# Patient Record
Sex: MALE | Race: WHITE | Employment: FULL TIME | URBAN - METROPOLITAN AREA
[De-identification: names, ages, dates, MRNs, and addresses within clinical notes are randomized per-mention and may not be internally consistent; named-entity substitution may affect disease eponyms.]

---

## 2017-06-29 ENCOUNTER — OFFICE VISIT (OUTPATIENT)
Dept: ORTHOPEDIC SURGERY | Age: 57
End: 2017-06-29

## 2017-06-29 VITALS
DIASTOLIC BLOOD PRESSURE: 90 MMHG | WEIGHT: 200 LBS | HEART RATE: 62 BPM | SYSTOLIC BLOOD PRESSURE: 133 MMHG | BODY MASS INDEX: 27.09 KG/M2 | HEIGHT: 72 IN

## 2017-06-29 DIAGNOSIS — M16.12 PRIMARY OSTEOARTHRITIS OF LEFT HIP: Primary | ICD-10-CM

## 2017-06-29 DIAGNOSIS — M25.552 LEFT HIP PAIN: ICD-10-CM

## 2017-06-29 PROCEDURE — 73502 X-RAY EXAM HIP UNI 2-3 VIEWS: CPT | Performed by: ORTHOPAEDIC SURGERY

## 2017-06-29 PROCEDURE — 99214 OFFICE O/P EST MOD 30 MIN: CPT | Performed by: ORTHOPAEDIC SURGERY

## 2017-08-23 DIAGNOSIS — M25.552 PAIN OF LEFT HIP JOINT: Primary | ICD-10-CM

## 2017-10-05 ENCOUNTER — OFFICE VISIT (OUTPATIENT)
Dept: ORTHOPEDIC SURGERY | Age: 57
End: 2017-10-05

## 2017-10-05 VITALS — BODY MASS INDEX: 27.08 KG/M2 | HEIGHT: 72 IN | WEIGHT: 199.96 LBS

## 2017-10-05 DIAGNOSIS — M16.12 PRIMARY OSTEOARTHRITIS OF LEFT HIP: Primary | ICD-10-CM

## 2017-10-05 PROCEDURE — 99214 OFFICE O/P EST MOD 30 MIN: CPT | Performed by: ORTHOPAEDIC SURGERY

## 2017-10-05 NOTE — LETTER
Attention    These are medical screening labs, not pre-admission surgery labs. Via Win Aguilar M.D.  [phone]  [address] [place] [address], [address] [place] [address]    Date:  10/5/2017    Name: Allison Guevara    : 1960    Please take this form with you.       THE FOLLOWING LABS NEED TO BE COMPLETED:    _x__UA  _x__URINE C/S (THIS NEEDS TO BE DONE EVEN IF THE UA IS NORMAL)  _x__CBC W/ DIFF  _x__PT/INR  _x__PTT  _x__TRANSFERRIN  _x__ALBUMIN  _x__CHEM 7  _x__HEMAGLOBIN A1-C  ____OTHER: _____________________________________________                         Diagnosis:  LT HIP OSTEOARTHRITIS            RT KNEE OA M17.11  LT KNEE OA M17.12         RT HIP OA  M16.11     LT HIP OA M16.12             Z01.812  (Pre-op examination code)      10/5/2017 5:13 PM  Sarina Ray PA-C

## 2017-10-05 NOTE — PROGRESS NOTES
appropriate. Lymphatic: The lymphatic examination bilaterally reveals all areas to be without enlargement or induration. Vascular: Examination reveals no swelling or calf tenderness. Peripheral pulses are palpable and 2+. Neurological: The patient has good coordination. There is no weakness or sensory deficit.     Left hip Examination:     Inspection:  No erythema or signs of infection. There are no cutaneous lesions.     Palpation:  No focal tenderness to palpation. No tenderness over the greater trochanteric region.     Range of Motion:  He has limited range of motion with flexion and external rotation. His internal rotation is significantly limited. This causes reproducible groin pain.      Strength:  5/5 abduction and adduction and flexion     Special Tests:  Positive log roll maneuver reproducing pain. Negative Mirlande's test.  Negative Homans test.     Skin: There are no rashes, ulcerations or lesions.     Gait: Normal     Reflex 2+ patellar     Additional Comments:        Additional Examinations:         Examination of the right hip reveals intact skin. The patient demonstrates full painless range of motion with regards to flexion, abduction, internal and external rotation. There is no tenderness about the greater trochanter. There is a negative straight leg raise against resistance. Strength is 5/5 throughout all planes.        Radiology:     X-rays obtained in the office today:  Views 2  Location left hip  Impression no fractures or malalignment identified. There is evidence of end-stage osteoarthritis of the left hip with femoral acetabular joint space narrowing with sclerosis, subchondral cysts and osteophyte formation.         Assessment :  Left Hip osteoarthritis     Impression:  Encounter Diagnoses   Name Primary?     Left hip pain      Primary osteoarthritis of left hip Yes         Office Procedures:        Orders Placed This Encounter   Procedures    Hip Left 2V       [address]       Order

## 2017-10-05 NOTE — MR AVS SNAPSHOT
Learn more at: [email or web address]          Instructions    Management discussed and patient voiced understanding. They were instructed to follow up with their PCP regarding any abnormal vitals reading. Medications and Orders      Allergies           No Known Allergies      We Ordered/Performed the following           OSR PT [facility] Physical Therapy     Scheduling Instructions:    Margaret Candelario and [address] [facility]  [address] [place] [address] [facility] [address]  [phone]    EVAL AND TREAT:  LT HIP OA  PreHab Evaluation for Total Joint Surgery    EVALUATION MUST BE COMPLETED ONE OF THE FOLLOWING OFFICES:    Cheryl [address]       ([address] [facility])  ([facility] [address] [address] [place] [address] [address]  [facility], [address]: [phone]   [facility]:  [phone]       [address] [facility])     [address] [facility] [address]      [facility]:  [phone]    Comments: The patient can be scheduled with any member of the group, including the provider with the first available appointments. Additional Information        Basic Information     Date Of Birth Sex Race Ethnicity Preferred Language    1960 Male White Non-/Non  English      Problem List as of 10/5/2017  Date Reviewed: 10/5/2017                Primary osteoarthritis of left hip      Preventive Care        Date Due    Hepatitis C screening is recommended for all adults regardless of risk factors born between [birth year] and 1965 at least once (lifetime) who have never been tested. 1960    HIV screening is recommended for all people regardless of risk factors  aged 15-65 years at least once (lifetime) who have never been HIV tested.  7/15/1976    Tetanus Combination Vaccine (1 - Tdap) 7/15/1980    Cholesterol Screening 7/15/2001 Diabetes Screening 7/15/2001    Colonoscopy 7/15/2011    Yearly Flu Vaccine (1) 9/1/2017            MyChart Signup           [employer] allows you to send messages to your doctor, view your test results, renew your prescriptions, schedule appointments, view visit notes, and more. How Do I Sign Up? 1. In your Internet browser, go to [email or web address]  2. Click on the Sign Up Now link in the Sign In box. You will see the New Member Sign Up page. 3. Enter your [employer] Access Code exactly as it appears below. You will not need to use this code after youve completed the sign-up process. If you do not sign up before the expiration date, you must request a new code. [employer] Access Code: [email or web address]  Expires: 12/4/2017  5:13 PM    4. Enter your Social Security Number ([id number]) and Date of Birth (mm/dd/yyyy) as indicated and click Submit. You will be taken to the next sign-up page. 5. Create a [employer] ID. This will be your [employer] login ID and cannot be changed, so think of one that is secure and easy to remember. 6. Create a [employer] password. You can change your password at any time. 7. Enter your Password Reset Question and Answer. This can be used at a later time if you forget your password. 8. Enter your e-mail address. You will receive e-mail notification when new information is available in [address] 9. Click Sign Up. You can now view your medical record. Additional Information  If you have questions, please contact the physician practice where you receive care. Remember, [employer] is NOT to be used for urgent needs. For medical emergencies, dial 911. For questions regarding your [employer] account call [phone]. If you have a clinical question, please call your doctor's office.

## 2017-10-24 ENCOUNTER — HOSPITAL ENCOUNTER (OUTPATIENT)
Dept: PHYSICAL THERAPY | Age: 57
Discharge: HOME | End: 2017-10-31
Admitting: ORTHOPAEDIC SURGERY

## 2017-10-24 NOTE — FLOWSHEET NOTE
[place] [address] [facility], [address]  Phone: [phone]  Fax [phone]    Physical Therapy Daily Treatment Note  Date:  10/24/2017    Patient Name:  Josh Hartley    :  1960  MRN: [id number]  Restrictions/Precautions:    Medical/Treatment Diagnosis Information:  · Diagnosis: Left hip OA (M16.12)  · Treatment Diagnosis: Left hip pain (J98.631)  Insurance/Certification information:  PT Insurance Information: [facility]  Physician Information:  Referring Practitioner: Davis Roy signed (Y/N): Routed this date    Date of Patient follow up with Physician:   G-Code (if applicable):      Date G-Code Applied:    PT G-Codes  Functional Assessment Tool Used: LEFS  Score: 13%  Functional Limitation: Mobility: Walking and moving around  Mobility: Walking and Moving Around Current Status (): At least 1 percent but less than 20 percent impaired, limited or restricted  Mobility: Walking and Moving Around Goal Status ():  At least 1 percent but less than 20 percent impaired, limited or restricted    Progress Note: [x]  Yes  []  No  Next due by: Visit #10       Latex Allergy:  [x]NO      []YES  Preferred Language for Healthcare:   [x]English       []other:    Visit # Insurance Allowable   1 30     Pain level:  2-3/10     SUBJECTIVE:  See eval    OBJECTIVE: See eval  Observation:   Test measurements:      RESTRICTIONS/PRECAUTIONS: None    Exercises/Interventions: DEMO 1 repetition to patient for each exercise    Therapeutic Ex Sets/reps Notes        Seated HS stretch 1 x30 sec HEP   Seated calf stretch 1x30 sec HEP   Seated QS BLEs 1 x 10\" HEP   Supine heelslide 1x HEP   Isometric abdominal 1x 10\" HEP   LAQ  1x HEP   minisquats 1x HEP                            Pt ed  HEP, POC, post op restrictions/precautions, healing process, expectations for surgery             Manual Intervention                                   NMR re-education                                                      Therapeutic Exercise and NMR EXR  [x] (99639) Provided verbal/tactile cueing for activities related to strengthening, flexibility, endurance, ROM for improvements in LE, proximal hip, and core control with self care, mobility, lifting, ambulation.  [] (94318) Provided verbal/tactile cueing for activities related to improving balance, coordination, kinesthetic sense, posture, motor skill, proprioception  to assist with LE, proximal hip, and core control in self care, mobility, lifting, ambulation and eccentric single leg control.      NMR and Therapeutic Activities:    [] (21214 or 69822) Provided verbal/tactile cueing for activities related to improving balance, coordination, kinesthetic sense, posture, motor skill, proprioception and motor activation to allow for proper function of core, proximal hip and LE with self care and ADLs  [] (30788) Gait Re-education- Provided training and instruction to the patient for proper LE, core and proximal hip recruitment and positioning and eccentric body weight control with ambulation re-education including up and down stairs     Home Exercise Program:    [x] (64173) Reviewed/Progressed HEP activities related to strengthening, flexibility, endurance, ROM of core, proximal hip and LE for functional self-care, mobility, lifting and ambulation/stair navigation   [] (56894)Reviewed/Progressed HEP activities related to improving balance, coordination, kinesthetic sense, posture, motor skill, proprioception of core, proximal hip and LE for self care, mobility, lifting, and ambulation/stair navigation      Manual Treatments:  PROM / STM / Oscillations-Mobs:  G-I, II, III, IV (PA's, Inf., Post.)  [] (20680) Provided manual therapy to mobilize LE, proximal hip and/or LS spine soft tissue/joints for the purpose of modulating pain, promoting relaxation,  increasing ROM, reducing/eliminating soft tissue

## 2017-10-24 NOTE — PLAN OF CARE
impaired, limited or restricted  Mobility: Walking and Moving Around Goal Status (): At least 1 percent but less than 20 percent impaired, limited or restricted    Pain Scale: 2-3/10  Easing factors: rest  Provocative factors: prolonged walking, prolonged standing, golfing    Type: []Constant   [x]Intermittent  []Radiating []Localized []other:     Numbness/Tingling: Denies N/T    Occupation/School:     Living Status/Prior Level of Function: Independent with ADLs and IADLs, Lives with wife; enjoys golfing, running, tennis, working out    Objective:          Reflexes/Myotomes:   [x]Dermatomes/Myotomes intact    [x]Reflexes equal and normal bilaterally   []Other:    Joint mobility:    []Normal    [x]Hypo in left hip   []Hyper    Palpation: no significant TTP    Functional Mobility/Transfers: Independent    Posture: Fair    Bandages/Dressings/Incisions: N/A    Gait: (include devices/WB status) Mild antalgic gait pattern. Becomes increasingly antalgic with more activity. Orthopedic Special Tests: None this date                       [x] Patient history, allergies, meds reviewed. Medical chart reviewed. See intake form. Review Of Systems (ROS):  [x]Performed Review of systems (Integumentary, CardioPulmonary, Neurological) by intake and observation. Intake form has been scanned into medical record. Patient has been instructed to contact their primary care physician regarding ROS issues if not already being addressed at this time.       Co-morbidities/Complexities (which will affect course of rehabilitation):   [x]None           Arthritic conditions   []Rheumatoid arthritis (M05.9)  []Osteoarthritis (M19.91)   Cardiovascular conditions   []Hypertension (I10)  []Hyperlipidemia (E78.5)  []Angina pectoris (I20)  []Atherosclerosis (I70)   Musculoskeletal conditions   []Disc pathology   []Congenital spine pathologies   []Prior surgical intervention  []Osteoporosis (M81.8)  []Osteopenia (M85.8) changes of positions or transfers between positions   []Reduced ability to maintain good posture and demonstrate good body mechanics with sitting, bending, and lifting   []Reduced ability to sleep   [] Reduced ability or tolerance with driving and/or computer work   []Reduced ability to perform lifting, carrying tasks   []Reduced ability to squat   []Reduced ability to forward bend   [x]Reduced ability to ambulate prolonged functional periods/distances/surfaces   []Reduced ability to ascend/descend stairs   [x]Reduced ability to run, hop, cut or jump   []other:    Participation Restrictions   []Reduced participation in self care activities   []Reduced participation in home management activities   []Reduced participation in work activities   [x]Reduced participation in social activities. [x]Reduced participation in sport/recreation activities. Classification :    []Signs/symptoms consistent with post-surgical status including decreased ROM, strength and function.    []Signs/symptoms consistent with joint sprain/strain   []Signs/symptoms consistent with patella-femoral syndrome   [x]Signs/symptoms consistent with knee OA/hip OA   []Signs/symptoms consistent with internal derangement of knee/Hip   []Signs/symptoms consistent with functional hip weakness/NMR control      []Signs/symptoms consistent with tendinitis/tendinosis    []signs/symptoms consistent with pathology which may benefit from Dry needling      []other:      Prognosis/Rehab Potential:      []Excellent   []Good    [x]Fair   []Poor    Tolerance of evaluation/treatment:    []Excellent   []Good    [x]Fair   []Poor  Physical Therapy Evaluation Complexity Justification  [x] A history of present problem with:  [x] no personal factors and/or comorbidities that impact the plan of care;  []1-2 personal factors and/or comorbidities that impact the plan of care  []3 personal factors and/or comorbidities that impact the plan of care  [x] An examination of body systems using standardized tests and measures addressing any of the following: body structures and functions (impairments), activity limitations, and/or participation restrictions;:  [x] a total of 1-2 or more elements   [] a total of 3 or more elements   [] a total of 4 or more elements   [x] A clinical presentation with:  [x] stable and/or uncomplicated characteristics   [] evolving clinical presentation with changing characteristics  [] unstable and unpredictable characteristics;   [x] Clinical decision making of [x] low, [] moderate, [] high complexity using standardized patient assessment instrument and/or measurable assessment of functional outcome. [x] EVAL (LOW) 56252 (typically 20 minutes face-to-face)  [] EVAL (MOD) 49953 (typically 30 minutes face-to-face)  [] EVAL (HIGH) 66994 (typically 45 minutes face-to-face)  [] RE-EVAL       PLAN:   Frequency/Duration:  1 visit:  Interventions:  [x]  Therapeutic exercise including: strength training, ROM, for Lower extremity and core   [x]  NMR activation and proprioception for LE, Glutes and Core   [x]  Manual therapy as indicated for LE, Hip and spine to include: Dry Needling/IASTM, STM, PROM, Gr I-IV mobilizations, manipulation. [x] Modalities as needed that may include: thermal agents, E-stim, Biofeedback, US, iontophoresis as indicated  [x] Patient education on joint protection, postural re-education, activity modification, progression of HEP. [x] Patient appears to be functionally prepared for surgery and will continue with a home exercise program until surgery date.   [] Patient will be ready for surgery with a short period of structured physical therapy  [] Patient does not appear to be functionally ready for surgery and requires a prolonged  structure program    At this point, it appears patient's discharge status from hospital should be:   [x] Home with home exercise program  [] Home with home health care  [] [address]

## 2017-11-01 ENCOUNTER — HOSPITAL ENCOUNTER (OUTPATIENT)
Dept: PHYSICAL THERAPY | Age: 57
Discharge: HOME | End: 2017-11-30
Attending: ORTHOPAEDIC SURGERY | Admitting: ORTHOPAEDIC SURGERY

## 2017-11-30 NOTE — LETTER
[facility]   Pre-Cert / Billing Form      Patient Name:  Sage Peters  Patient :  1960     Patient SS#:      Patient Phone:  [phone] (home) [phone] (work)   Patient Address:  [place] [address]    PCP:  Baljinder Sanchez MD    Insurance:  Payor: Ayan Doe / Plan: [facility] / Product Type: *No Product type* /     Insurance ID Number:   [id number]     Date:  17  Location:  [facility]  Type:    INPATIENT  Surgeon:  Lauren Duggan M.D.                                     BILLING INFORMATION:                                                                                                Procedure/Diagnosis           CPT Code/ICD-9     Modifier            Pre-Certification:    Precert information:

## 2017-11-30 NOTE — LETTER
11/30/2017            Please complete the enclosed survey and return prior to your surgery. If you have any questions please contact the office at [phone]. Thank you in advance for your participation. Hip dysfunction and Osteoarthritis Outcome Score for Joint Replacement (JR PHYLLIS.), English version 1.0     JR PHYLLIS. HIP SURVEY     Eileen Guido         1960               11/30/2017                         INSTRUCTIONS: This survey asks for your view about your hip. This information will help us keep track of how you feel about your hip and how well you are able to do your usual activities. Answer every question by ticking the appropriate box, only one box for each question. If you are unsure about how to answer a question, please give the best answer you can. Pain   What amount of hip pain have you experienced the last week during the following activities? 1. Going up or down stairs               None                Mild                Moderate                Severe                 Extreme                                                                                                              2. Walking on an uneven surface            None                Mild                Moderate                Severe                 Extreme                                                                                                               Function, daily living   The following questions concern your physical function. By this we mean your ability to move around and to look after yourself. For each of the following activities please indicate the degree of difficulty you have experienced in the last week due to your hip.       3. Rising from sitting            None                Mild                Moderate                Severe                Extreme

## 2017-12-05 ENCOUNTER — HOSPITAL ENCOUNTER (OUTPATIENT)
Dept: CT IMAGING | Age: 57
Discharge: HOME | End: 2017-12-05
Attending: ORTHOPAEDIC SURGERY | Admitting: ORTHOPAEDIC SURGERY

## 2017-12-05 DIAGNOSIS — M25.552 PAIN, JOINT, HIP, LEFT: ICD-10-CM

## 2017-12-05 LAB
ALBUMIN SERPL-MCNC: 4.3 G/DL (ref 3.4–5)
ANION GAP SERPL CALCULATED.3IONS-SCNC: 11 MMOL/L (ref 3–16)
APTT: 31.1 SEC (ref 24.1–34.9)
BASOPHILS ABSOLUTE: 0 K/UL (ref 0–0.2)
BASOPHILS RELATIVE PERCENT: 0.4 %
BILIRUBIN URINE: NEGATIVE
BLOOD, URINE: ABNORMAL
BUN BLDV-MCNC: 17 MG/DL (ref 7–20)
CALCIUM SERPL-MCNC: 9.2 MG/DL (ref 8.3–10.6)
CHLORIDE BLD-SCNC: 101 MMOL/L (ref 99–110)
CLARITY: CLEAR
CO2: 27 MMOL/L (ref 21–32)
COLOR: YELLOW
CREAT SERPL-MCNC: 1 MG/DL (ref 0.9–1.3)
EOSINOPHILS ABSOLUTE: 0.2 K/UL (ref 0–0.6)
EOSINOPHILS RELATIVE PERCENT: 3.6 %
ESTIMATED AVERAGE GLUCOSE: 99.7 MG/DL
GFR AFRICAN AMERICAN: >60
GFR NON-AFRICAN AMERICAN: >60
GLUCOSE BLD-MCNC: 90 MG/DL (ref 70–99)
GLUCOSE URINE: NEGATIVE MG/DL
HBA1C MFR BLD: 5.1 %
HCT VFR BLD CALC: 44.3 % (ref 40.5–52.5)
HEMOGLOBIN: 15.3 G/DL (ref 13.5–17.5)
INR BLD: 1.03 (ref 0.85–1.15)
KETONES, URINE: NEGATIVE MG/DL
LEUKOCYTE ESTERASE, URINE: NEGATIVE
LYMPHOCYTES ABSOLUTE: 1.9 K/UL (ref 1–5.1)
LYMPHOCYTES RELATIVE PERCENT: 39.1 %
MCH RBC QN AUTO: 32.2 PG (ref 26–34)
MCHC RBC AUTO-ENTMCNC: 34.6 G/DL (ref 31–36)
MCV RBC AUTO: 92.9 FL (ref 80–100)
MICROSCOPIC EXAMINATION: YES
MONOCYTES ABSOLUTE: 0.4 K/UL (ref 0–1.3)
MONOCYTES RELATIVE PERCENT: 7.8 %
NEUTROPHILS ABSOLUTE: 2.4 K/UL (ref 1.7–7.7)
NEUTROPHILS RELATIVE PERCENT: 49.1 %
NITRITE, URINE: NEGATIVE
PDW BLD-RTO: 13 % (ref 12.4–15.4)
PH UA: 6
PLATELET # BLD: 220 K/UL (ref 135–450)
PMV BLD AUTO: 8.1 FL (ref 5–10.5)
POTASSIUM SERPL-SCNC: 4.1 MMOL/L (ref 3.5–5.1)
PROTEIN UA: NEGATIVE MG/DL
PROTHROMBIN TIME: 11.6 SEC (ref 9.6–13)
RBC # BLD: 4.76 M/UL (ref 4.2–5.9)
RBC UA: NORMAL /HPF (ref 0–2)
SODIUM BLD-SCNC: 139 MMOL/L (ref 136–145)
SPECIFIC GRAVITY UA: 1.02
TRANSFERRIN: 244 MG/DL (ref 200–360)
URINE TYPE: ABNORMAL
UROBILINOGEN, URINE: 0.2 E.U./DL
WBC # BLD: 5 K/UL (ref 4–11)
WBC UA: NORMAL /HPF (ref 0–5)

## 2017-12-06 LAB — URINE CULTURE, ROUTINE: NORMAL

## 2017-12-08 ENCOUNTER — TELEPHONE (OUTPATIENT)
Dept: ORTHOPEDIC SURGERY | Age: 57
End: 2017-12-08

## 2017-12-08 NOTE — ADDENDUM NOTE
Encounter addended by: Christopher Ragland MA on: 12/8/2017 10:51 AM<BR>    Actions taken: Letter status changed

## 2017-12-20 PROBLEM — M16.12 OSTEOARTHRITIS OF LEFT HIP: Status: ACTIVE | Noted: 2017-12-20

## 2017-12-27 ENCOUNTER — TELEPHONE (OUTPATIENT)
Dept: ORTHOPEDIC SURGERY | Age: 57
End: 2017-12-27

## 2017-12-27 NOTE — TELEPHONE ENCOUNTER
Spoke with pt. Incision status: No drainage or redness, changed dressing    Edema/Swelling/Teds: Left foot a little swollen. Wearing TEDS daily    Pain level and status: Pain is really not bad    Use of pain medications: Has only taken 3 percocet total, using mobic. Blood thinner: on aspirin BID    Bowels: Passing gas, a little constipated. Stopped percocet and back to normal    Home Care Agency active: N/A    Outpatient therapy: Not yet    Do you have all of your medications: Yes    Changes in medications: No    Ortho Vitals: Pt states he hasn't used it at all. He is a , so he is always staring at numbers and wanted a break.       Follow up appointments:    Future Appointments  Date Time Provider Gomez Bashir   1/2/2018 4:00 PM Dang De Jesus, [employer] [facility]   5/10/8026 3:04 PM Evan Bradshaw, PT [facility] None

## 2018-01-02 ENCOUNTER — OFFICE VISIT (OUTPATIENT)
Dept: ORTHOPEDIC SURGERY | Age: 58
End: 2018-01-02

## 2018-01-02 VITALS
SYSTOLIC BLOOD PRESSURE: 127 MMHG | BODY MASS INDEX: 26.76 KG/M2 | WEIGHT: 201.94 LBS | HEIGHT: 73 IN | DIASTOLIC BLOOD PRESSURE: 84 MMHG | HEART RATE: 67 BPM

## 2018-01-02 DIAGNOSIS — M16.12 PRIMARY OSTEOARTHRITIS OF LEFT HIP: ICD-10-CM

## 2018-01-02 DIAGNOSIS — M25.552 LEFT HIP PAIN: ICD-10-CM

## 2018-01-02 DIAGNOSIS — Z96.642 STATUS POST TOTAL REPLACEMENT OF LEFT HIP: Primary | ICD-10-CM

## 2018-01-02 PROCEDURE — 99024 POSTOP FOLLOW-UP VISIT: CPT | Performed by: PHYSICIAN ASSISTANT

## 2018-01-08 ENCOUNTER — TELEPHONE (OUTPATIENT)
Dept: ORTHOPEDIC SURGERY | Age: 58
End: 2018-01-08

## 2018-01-12 ENCOUNTER — TELEPHONE (OUTPATIENT)
Dept: ORTHOPEDIC SURGERY | Age: 58
End: 2018-01-12

## 2018-01-12 NOTE — TELEPHONE ENCOUNTER
Pt called pt about concerning red spot on his incision. Pt states he noticed redness today at top of incision. Part of dressing is peeling in that area, pt states he doesn't believe the dressing pulled off of that area forceably. Pt states area is slightly hard in that area, no where else. Pt states he had fever 2 nights ago. Skin not hot or warm in area or having any drainage. Instructed pt to keep eye on area and to call nurse back immediately with any increased redness, firmness, warmth, or fever. Wily WRIGHT to pt concern: he wanted pt to log into ortho vitals and take photo on incision so we can visualize concern.     [address] Nurse Navigator  Phone number: [phone]

## 2018-01-12 NOTE — TELEPHONE ENCOUNTER
Left voicemail for pt instructing pt to take photo of incision and submit into orthovitals agatha per Laureen WRIGHT instructions.     Antonia Elizabeth  Orthopedic Nurse Navigator  Phone number: [phone]

## 2018-01-17 ENCOUNTER — HOSPITAL ENCOUNTER (OUTPATIENT)
Dept: PHYSICAL THERAPY | Age: 58
Discharge: HOME | End: 2018-01-31
Admitting: ORTHOPAEDIC SURGERY

## 2018-01-17 ENCOUNTER — OFFICE VISIT (OUTPATIENT)
Dept: ORTHOPEDIC SURGERY | Age: 58
End: 2018-01-17

## 2018-01-17 DIAGNOSIS — Z96.642 STATUS POST TOTAL REPLACEMENT OF LEFT HIP: Primary | ICD-10-CM

## 2018-01-17 PROCEDURE — 99024 POSTOP FOLLOW-UP VISIT: CPT | Performed by: PHYSICIAN ASSISTANT

## 2018-01-17 NOTE — PROGRESS NOTES
History of present illness: The patient returns today after left anterior total hip replacement. Pain control as been satisfactory with oral medications. There have been no fevers or chills. Pain Assessment  Location of Pain: Pelvis  Location Modifiers: Left  Severity of Pain: 0  Limiting Behavior: No  Result of Injury: No  Work-Related Injury: No  Are there other pain locations you wish to document?: No]    Physical examination: The incision is clean, dry, and intact but There is some erythema around both incisions consistent with contact dermatitis. He does experience some itching in these areas as well. There is expected swelling with no evidence of DVT and a negative Homans sign. Neurovascular exam is intact. Leg length is appropriate. He relates well without the use of assistance. Assessment/plan: We would like to begin physical therapy to restore range of motion and strength. He can apply some topical hydrocortisone to the area around the incisions try to decrease some of the erythema. I would like to see him back in 2 weeks to recheck the wound to make sure this is resolving. We did not refilled their pain medication today.

## 2018-01-17 NOTE — PLAN OF CARE
[facility], [address]  Phone: [phone]  Fax [phone]     Physical Therapy Certification    Dear Referring Practitioner: Dr. Diogenes Mg,    We had the pleasure of evaluating the following patient for physical therapy services at [address] [place] [address]. A summary of our findings can be found in the initial assessment below. This includes our plan of care. If you have any questions or concerns regarding these findings, please do not hesitate to contact me at the office phone number checked above. Thank you for the referral.       Physician Signature:_______________________________Date:__________________  By signing above (or electronic signature), therapists plan is approved by physician    Patient: Audra Peck   : 1960   MRN: [id number]  Referring Physician: Referring Practitioner: Dr. Diogenes Mg      Evaluation Date: 2018      Medical Diagnosis Information:  Diagnosis: Left hip OA  M16.12,  Left hip pain  M25.552   Treatment Diagnosis: Left hip pain  M25.552                                         Insurance information: PT Insurance Information:  [facility]  - [id number]-PT/OT MED NEC     Precautions/ Contra-indications: anterior hip replacement  Latex Allergy:  [x]NO      []YES  Preferred Language for Healthcare:   [x]English       []other:    SUBJECTIVE: Patient stated complaint:pt has had pain in the lateral hip for two years. He had finally had enough. Pt had surgery. Went home after one day. Pt used walker for 4 weeks. Pt is having some irritation by incision. Relevant Medical History:n/a  Functional Disability Index:PT G-Codes  Functional Assessment Tool Used: LEFS  Score: 23%  Functional Limitation: Mobility: Walking and moving around  Mobility: Walking and Moving Around Current Status ():  At least 20 percent but less than 40 percent impaired, limited or restricted  Mobility: Walking and Moving Around Goal Status (): At least 1 percent but less than 20 percent impaired, limited or restricted    Pain Scale: 0/10  Easing factors: rest  Provocative factors: stairs, walking,      Type: []Constant   []Intermittent  []Radiating []Localized []other:     Numbness/Tingling: n/a    Occupation/School:     Living Status/Prior Level of Function: Independent with ADLs and IADLs, Golf, tennis, cardio, walking    OBJECTIVE:     ROM LEFT RIGHT   HIP Flex 90    HIP Abd 30    Knee ext 0    Knee Flex 130    Strength  LEFT RIGHT   HIP Flexors 3+  (did not apply resistance) 5   HIP Abductors     Knee EXT (quad) 5 5   Knee Flex (HS) 5 5   Ankle DF 5 5     Reflexes/Sensation:    [x]Dermatomes/Myotomes intact    [x]Reflexes equal and normal bilaterally   []Other:    Joint mobility:    [x]Normal    []Hypo   []Hyper    Palpation: tender along incision site    Functional Mobility/Transfers: Indep    Posture: iliac crests are aligned    Bandages/Dressings/Incisions: Pt has redness around incision site. Pt has some drainage. PA saw incision site this AM, and recommended using hydrocortisone cream    Gait: (include devices/WB status) symmetrical on level surfaces    Orthopedic Special Tests:  Negative Gamaliel's. Tight HS. Quads and hip flexor not assessed today. [x] Patient history, allergies, meds reviewed. Medical chart reviewed. See intake form. Review Of Systems (ROS):  [x]Performed Review of systems (Integumentary, CardioPulmonary, Neurological) by intake and observation. Intake form has been scanned into medical record. Patient has been instructed to contact their primary care physician regarding ROS issues if not already being addressed at this time.       Co-morbidities/Complexities (which will affect course of rehabilitation):   [x]None           Arthritic conditions   []Rheumatoid arthritis (M05.9)  []Osteoarthritis []Excellent   [x]Good    []Fair   []Poor  Physical Therapy Evaluation Complexity Justification  [x] A history of present problem with:  [x] no personal factors and/or comorbidities that impact the plan of care;  []1-2 personal factors and/or comorbidities that impact the plan of care  []3 personal factors and/or comorbidities that impact the plan of care  [x] An examination of body systems using standardized tests and measures addressing any of the following: body structures and functions (impairments), activity limitations, and/or participation restrictions;:  [] a total of 1-2 or more elements   [x] a total of 3 or more elements   [] a total of 4 or more elements   [x] A clinical presentation with:  [x] stable and/or uncomplicated characteristics   [] evolving clinical presentation with changing characteristics  [] unstable and unpredictable characteristics;   [x] Clinical decision making of [x] low, [] moderate, [] high complexity using standardized patient assessment instrument and/or measurable assessment of functional outcome. [x] EVAL (LOW) 43599 (typically 20 minutes face-to-face)  [] EVAL (MOD) 78725 (typically 30 minutes face-to-face)  [] EVAL (HIGH) 50230 (typically 45 minutes face-to-face)  [] RE-EVAL       PLAN:   Frequency/Duration:  1 days per week for 6 Weeks:  Interventions:  [x]  Therapeutic exercise including: strength training, ROM, for Lower extremity and core   [x]  NMR activation and proprioception for LE, Glutes and Core   [x]  Manual therapy as indicated for LE, Hip and spine to include: Dry Needling/IASTM, STM, PROM, Gr I-IV mobilizations, manipulation. [x] Modalities as needed that may include: thermal agents, E-stim, Biofeedback, US, iontophoresis as indicated  [x] Patient education on joint protection, postural re-education, activity modification, progression of HEP. HEP instruction: (see scanned forms)    GOALS:  Patient stated goal: Complete recovery.   Golf in april    Therapist goals for Patient:   Short Term Goals: To be achieved in: 2 weeks  1. Independent in HEP and progression per patient tolerance, in order to prevent re-injury. 2. Patient will have a decrease in pain to facilitate improvement in movement, function, and ADLs as indicated by Functional Deficits. Long Term Goals: To be achieved in: 6 weeks  1. Disability index score of 0% or less for the LEFS to assist with reaching prior level of function. 2. Patient will demonstrate increased AROM to 110 hip flex, 45 abd to allow for proper joint functioning as indicated by patients Functional Deficits. 3. Patient will demonstrate an increase in Strength to good proximal hip strength and control, within 5lb HHD in LE to allow for proper functional mobility as indicated by patients Functional Deficits. 4. Patient will return to walking on unlevel surfaces (grass, gravel, etc)  without increased symptoms or restriction. 5. Possible GAP candidate for return to golf.      Electronically signed by:  Asia Finnegan PT

## 2018-01-23 ENCOUNTER — TELEPHONE (OUTPATIENT)
Dept: CASE MANAGEMENT | Age: 58
End: 2018-01-23

## 2018-01-23 NOTE — TELEPHONE ENCOUNTER
Spoke with pt, doing well. Incision status: No drainage or redness, red spot gone and using hydrocortisone for itching and doing wonderful. Edema/Swelling/Teds: Slight swelling in foot. Pain level and status: Not bad    Use of pain medications: Not using    Blood thinner: Finished    Bowels: Going well    Home Care Agency active: N/A    Outpatient therapy: Started yesterday    Do you have all of your medications: Yes    Changes in medications: no    Ortho Vitals: Finished. Follow up appointments:    Future Appointments  Date Time Provider Gomez Bashir   1/31/2018 3:50 PM Linda Louis [place] [facility]   6/64/2013 5:02 PM Kay Guevara, PT [facility] None     Signed off from pt. Instructed pt to call RN or Edu office with any issues or concerns.

## 2018-01-31 ENCOUNTER — OFFICE VISIT (OUTPATIENT)
Dept: ORTHOPEDIC SURGERY | Age: 58
End: 2018-01-31

## 2018-01-31 ENCOUNTER — HOSPITAL ENCOUNTER (OUTPATIENT)
Dept: PHYSICAL THERAPY | Age: 58
Discharge: HOME | End: 2018-02-01
Admitting: ORTHOPAEDIC SURGERY

## 2018-01-31 DIAGNOSIS — M16.12 PRIMARY OSTEOARTHRITIS OF LEFT HIP: Primary | ICD-10-CM

## 2018-01-31 PROCEDURE — 99024 POSTOP FOLLOW-UP VISIT: CPT | Performed by: PHYSICIAN ASSISTANT

## 2018-01-31 NOTE — PROGRESS NOTES
History of present illness: The patient returns today after left anterior total hip replacement. Pain control as been satisfactory with oral medications. There have been no fevers or chills. He is 6 weeks postop and very happy with his results. Pain Assessment  Location of Pain: Pelvis  Location Modifiers: Left  Severity of Pain: 0  Limiting Behavior: No  Result of Injury: No  Work-Related Injury: No  Are there other pain locations you wish to document?: No]    Physical examination: The incision is clean, dry, and intact. The tensioning erythema from the contact dermatitis has improved significantly with the help of hydrocortisone cream.  There is expected swelling with no evidence of DVT and a negative Homans sign. Neurovascular exam is intact. Leg length is appropriate. Assessment/plan: He is doing well with physical therapy and will continue with an independent home exercise program and progress to gym routine.   We'll plan on seeing her back in one year for an annual checkup or sooner if there is any issues

## 2018-01-31 NOTE — FLOWSHEET NOTE
lifting and ambulation/stair navigation   [] (13361)Reviewed/Progressed HEP activities related to improving balance, coordination, kinesthetic sense, posture, motor skill, proprioception of core, proximal hip and LE for self care, mobility, lifting, and ambulation/stair navigation      Manual Treatments:  PROM / STM / Oscillations-Mobs:  G-I, II, III, IV (PA's, Inf., Post.)  [] (43860) Provided manual therapy to mobilize LE, proximal hip and/or LS spine soft tissue/joints for the purpose of modulating pain, promoting relaxation,  increasing ROM, reducing/eliminating soft tissue swelling/inflammation/restriction, improving soft tissue extensibility and allowing for proper ROM for normal function with self care, mobility, lifting and ambulation. Modalities:      Charges:  Timed Code Treatment Minutes: 30   Total Treatment Minutes: 35     [] EVAL (LOW) 80254 (typically 20 minutes face-to-face)  [] EVAL (MOD) 69022 (typically 30 minutes face-to-face)  [] EVAL (HIGH) 18085 (typically 45 minutes face-to-face)  [] RE-EVAL     [x] ST(16872) x  1   [] IONTO  [] NMR (25293) x      [] VASO  [x] Manual (94014) x  1    [] Other:  [] TA x       [] Mech Traction (70406)  [] ES(attended) (49672)      [] ES (un) (37377):     GOALS:   Patient stated goal: Complete recovery. Golf in april    Therapist goals for Patient:   Short Term Goals: To be achieved in: 2 weeks  1. Independent in HEP and progression per patient tolerance, in order to prevent re-injury. 2. Patient will have a decrease in pain to facilitate improvement in movement, function, and ADLs as indicated by Functional Deficits. Long Term Goals: To be achieved in: 6 weeks  1. Disability index score of 0% or less for the LEFS to assist with reaching prior level of function. 2. Patient will demonstrate increased AROM to 110 hip flex, 45 abd to allow for proper joint functioning as indicated by patients Functional Deficits.    3. Patient will demonstrate an increase in Strength to good proximal hip strength and control, within 5lb HHD in LE to allow for proper functional mobility as indicated by patients Functional Deficits. 4. Patient will return to walking on unlevel surfaces (grass, gravel, etc)  without increased symptoms or restriction. 5. Possible GAP candidate for return to golf. New or Updated Goals (if applicable):  [x] No change to goals established upon initial eval/last progress note:  New Goals:    Progression Towards Functional goals:   [x] Patient is progressing as expected towards functional goals listed. [] Progression is slowed due to complexities listed. [] Progression has been slowed due to co-morbidities.   [] Plan just implemented, too soon to assess goals progression  [] Other:     ASSESSMENT:    [] Improvement noted relative to goals:  [] No Improvement noted related to goals:  Summary/Patient's response to treatment: See Eval    Treatment/Activity Tolerance:  [x] Patient tolerated treatment well [] Patient limited by fatique  [] Patient limited by pain  [] Patient limited by other medical complications  [] Other:     Prognosis: [x] Good [] Fair  [] Poor    Patient Requires Follow-up: [x] Yes  [] No    PLAN: See eval  [x] Continue per plan of care [] Alter current plan (see comments)  [] Plan of care initiated [] Hold pending MD visit [] Discharge    Electronically signed by: Velia Jeffery PT

## 2018-02-01 ENCOUNTER — HOSPITAL ENCOUNTER (OUTPATIENT)
Dept: PHYSICAL THERAPY | Age: 58
Discharge: HOME | End: 2018-02-28
Attending: ORTHOPAEDIC SURGERY | Admitting: ORTHOPAEDIC SURGERY

## 2018-03-01 ENCOUNTER — HOSPITAL ENCOUNTER (OUTPATIENT)
Dept: PHYSICAL THERAPY | Age: 58
Discharge: HOME | End: 2018-03-31
Attending: ORTHOPAEDIC SURGERY | Admitting: ORTHOPAEDIC SURGERY

## 2018-07-05 ENCOUNTER — TELEPHONE (OUTPATIENT)
Dept: ORTHOPEDIC SURGERY | Age: 58
End: 2018-07-05

## 2018-07-05 NOTE — TELEPHONE ENCOUNTER
I received a phone call from patient's insurance company today asking for specifics concerning patient's previous surgery from December 2017 and the necessity of the CT scan. I have discussed the case with Dr. Kong Christopher.

## 2018-08-06 ENCOUNTER — OFFICE VISIT (OUTPATIENT)
Dept: ORTHOPEDIC SURGERY | Age: 58
End: 2018-08-06

## 2018-08-06 VITALS — WEIGHT: 205 LBS | BODY MASS INDEX: 27.17 KG/M2 | HEIGHT: 73 IN

## 2018-08-06 DIAGNOSIS — S63.634A SPRAIN OF INTERPHALANGEAL JOINT OF RIGHT RING FINGER, INITIAL ENCOUNTER: ICD-10-CM

## 2018-08-06 DIAGNOSIS — M79.641 RIGHT HAND PAIN: Primary | ICD-10-CM

## 2018-08-06 PROCEDURE — 3017F COLORECTAL CA SCREEN DOC REV: CPT | Performed by: ORTHOPAEDIC SURGERY

## 2018-08-06 PROCEDURE — 1036F TOBACCO NON-USER: CPT | Performed by: ORTHOPAEDIC SURGERY

## 2018-08-06 PROCEDURE — G8419 CALC BMI OUT NRM PARAM NOF/U: HCPCS | Performed by: ORTHOPAEDIC SURGERY

## 2018-08-06 PROCEDURE — G8427 DOCREV CUR MEDS BY ELIG CLIN: HCPCS | Performed by: ORTHOPAEDIC SURGERY

## 2018-08-06 PROCEDURE — 99212 OFFICE O/P EST SF 10 MIN: CPT | Performed by: ORTHOPAEDIC SURGERY

## 2018-08-06 NOTE — PROGRESS NOTES
Chief Complaint  New Patient (R RING FINGER)      History of Present Illness:  Corby Huston is a 63 y.o. male. He presents today for a new hand surgery specialty evaluation regarding his right ring finger. I have known him from taking care of his wife who had complex elbow injuries in the past, and he reports that he injured his right ring finger metacarpal playing college football many years ago. However, somewhat recently he was helping put together some furniture at his son's house when he jammed his right ring finger. He did notice some occasional soreness along the PIP level but no mechanical locking or catching. He has been able to participate in at least 2 rounds of golf without significant discomfort. Medical History  Patient's medications, allergies, past medical, surgical, social and family histories were reviewed and updated as appropriate. Review of Systems  Pertinent items are noted in HPI  Review of systems reviewed from Patient History Form dated on 8/6/18 and available in the patient's chart under the Media tab. Vital Signs  There were no vitals filed for this visit. General Exam:   Constitutional: Patient is adequately groomed with no evidence of malnutrition  Mental Status: The patient is oriented to time, place and person. The patient's mood and affect are appropriate. Lymphatic: The lymphatic examination bilaterally reveals all areas to be without enlargement or induration. Neurological: The patient has good coordination. There is no weakness or sensory deficit. Finger Examination  Inspection:  There is no dramatic swelling other than perhaps a very mild trace fullness about the PIP joint of the right ring finger. Resting alignment is maintained. Palpation:  Tenderness as I palpate along the right ring finger PIP collateral ligaments and volar plate. No tenderness over the flexor tendon sheath.     Range of Motion:  Full range of motion with normal alignment of the digits. Strength:  Normal    Special Tests:  Normal intact stability at the IP and MP joints    Skin: There are no additional worrisome rashes, ulcerations or lesions. Gait: normal    Circulation: well perfused        Additional Comments:     Additional Examinations:  Left Upper Extremity: Examination of the left upper extremity does not show any tenderness, deformity or injury. Range of motion is unremarkable. There is no gross instability. There are no rashes, ulcerations or lesions. Strength and tone are normal.      Radiology:     X-rays obtained and reviewed in office:  Views 3 views  Location right hand  Impression x-rays demonstrate a concentric alignment at the PIP joint without signs of subluxation or fracture. Incidentally noted is an old healed fracture of the ring finger metacarpal           Assessment:  Right ring finger PIP joint sprain    Impression:   Encounter Diagnoses   Name Primary?  Right hand pain Yes    Sprain of interphalangeal joint of right ring finger, initial encounter        Office Procedures:  Orders Placed This Encounter   Procedures    XR HAND RIGHT (MIN 3 VIEWS)       Treatment Plan:  I'm overall pleased that he has maintained a full range of motion and we talked about using comfort as his guide to advancing forward to tennis and weight lifting in addition to golf. I suspect this may remain sore over the next few weeks but it should subside steadily over time. I will be happy to see him back p.r.n. Please note that this transcription was created using voice recognition software. Any errors are unintentional and may be due to voice recognition transcription.

## 2018-11-12 ENCOUNTER — OFFICE VISIT (OUTPATIENT)
Dept: ORTHOPEDIC SURGERY | Age: 58
End: 2018-11-12
Payer: COMMERCIAL

## 2018-11-12 DIAGNOSIS — Z96.642 HISTORY OF TOTAL HIP REPLACEMENT, LEFT: Primary | ICD-10-CM

## 2018-11-12 DIAGNOSIS — M25.552 LEFT HIP PAIN: ICD-10-CM

## 2018-11-12 PROCEDURE — 3017F COLORECTAL CA SCREEN DOC REV: CPT | Performed by: ORTHOPAEDIC SURGERY

## 2018-11-12 PROCEDURE — 1036F TOBACCO NON-USER: CPT | Performed by: ORTHOPAEDIC SURGERY

## 2018-11-12 PROCEDURE — 99213 OFFICE O/P EST LOW 20 MIN: CPT | Performed by: ORTHOPAEDIC SURGERY

## 2018-11-12 PROCEDURE — G8427 DOCREV CUR MEDS BY ELIG CLIN: HCPCS | Performed by: ORTHOPAEDIC SURGERY

## 2018-11-12 PROCEDURE — G8484 FLU IMMUNIZE NO ADMIN: HCPCS | Performed by: ORTHOPAEDIC SURGERY

## 2018-11-12 PROCEDURE — G8419 CALC BMI OUT NRM PARAM NOF/U: HCPCS | Performed by: ORTHOPAEDIC SURGERY

## 2018-11-12 NOTE — PROGRESS NOTES
Chief Complaint    Hip Pain (1 yr s/p lt ant thr hebert; doing well, had some mid thigh pain)      History of Present Illness:  Ольга Weston is a 63 y.o. male who comes in today for 1 year checkup on their total hip replacement. There is doing very well with little to no complaints. They've been able to resume most all levels of activity without any limitations. They're preoperative pain is gone and denies any rales with fevers, chills, or other constitutional symptoms.       Pain Assessment  Location of Pain: Leg  Location Modifiers: Left  Severity of Pain: 2  Frequency of Pain: Intermittent  Aggravating Factors: Exercise  Limiting Behavior: Some  Result of Injury: No  Work-Related Injury: No  Are there other pain locations you wish to document?: No    Medical History:  Past Medical History:   Diagnosis Date    Fractures 1982    RIGHT HABD    Osteoarthritis      Patient Active Problem List    Diagnosis Date Noted    Sprain of interphalangeal joint of right ring finger 08/06/2018    Status post total replacement of left hip 01/02/2018    Osteoarthritis of left hip 12/20/2017    Primary osteoarthritis of left hip 04/11/2016     Past Surgical History:   Procedure Laterality Date    COLONOSCOPY      JOINT REPLACEMENT Left 12/20/2017    total hip replacement, anterior approach    TONSILLECTOMY      VASECTOMY  1995     Family History   Problem Relation Age of Onset    Cancer Father         melanoma     Social History     Social History    Marital status:      Spouse name: N/A    Number of children: N/A    Years of education: N/A     Social History Main Topics    Smoking status: Never Smoker    Smokeless tobacco: Never Used    Alcohol use 1.2 - 1.8 oz/week     2 - 3 Glasses of wine per week      Comment: WEEKLY    Drug use: No    Sexual activity: Not Asked     Other Topics Concern    None     Social History Narrative    None     Current Outpatient Prescriptions   Medication Sig Dispense Refill    meloxicam (MOBIC) 7.5 MG tablet Take 1 tablet by mouth daily 30 tablet 0    aspirin 325 MG EC tablet Take 1 tablet by mouth 2 times daily 60 tablet 0    Multiple Vitamins-Minerals (CENTRUM SILVER 50+MEN PO) Take by mouth daily       No current facility-administered medications for this visit. Review of Systems:  Relevant review of systems reviewed and available in the patient's chart      Vital Signs: There were no vitals taken for this visit. General Exam:   Constitutional: Patient is adequately groomed with no evidence of malnutrition  DTRs: Deep tendon reflexes are intact  Mental Status: The patient is oriented to time, place and person. The patient's mood and affect are appropriate. Lymphatic: The lymphatic examination bilaterally reveals all areas to be without enlargement or induration. Vascular: Examination reveals no swelling or calf tenderness. Peripheral pulses are palpable and 2+. Neurological: The patient has good coordination. There is no weakness or sensory deficit. Left Hip Examination:    Inspection:  There is a well-healed surgical incision without signs of infection. Leg length is appropriate. Palpation:  Nontender to palpation    Range of Motion:  Full active and passive range of motion of the hip without reproducible pain in the groin or lateral hip. Strength:  Strength with flexion and abduction is 5/5. Special Tests:  Negative straight leg raise against resistance. Skin: There are no rashes, ulcerations or lesions. Gait: Normal gait pattern    Reflex normal deep tendon reflexes    Additional Comments:       Additional Examinations:         Contralateral Exam: Examination of the right hip reveals intact skin. The patient demonstrates full painless range of motion with regards to flexion, abduction, internal and external rotation. There is no tenderness about the greater trochanter. There is a negative straight leg raise against resistance.

## 2025-06-19 ENCOUNTER — HOSPITAL ENCOUNTER (OUTPATIENT)
Age: 65
Discharge: HOME | End: 2025-06-19
Payer: COMMERCIAL

## 2025-06-19 ENCOUNTER — HOSPITAL ENCOUNTER (OUTPATIENT)
Dept: GENERAL RADIOLOGY | Age: 65
Discharge: HOME | End: 2025-06-19
Payer: COMMERCIAL

## 2025-06-19 DIAGNOSIS — R09.89 ABNORMAL CHEST SOUNDS: ICD-10-CM

## 2025-06-19 PROCEDURE — 71046 X-RAY EXAM CHEST 2 VIEWS: CPT
